# Patient Record
Sex: FEMALE
[De-identification: names, ages, dates, MRNs, and addresses within clinical notes are randomized per-mention and may not be internally consistent; named-entity substitution may affect disease eponyms.]

---

## 2020-01-23 ENCOUNTER — HOSPITAL ENCOUNTER (OUTPATIENT)
Dept: HOSPITAL 92 - BICMAMMO | Age: 35
Discharge: HOME | End: 2020-01-23
Attending: OBSTETRICS & GYNECOLOGY
Payer: COMMERCIAL

## 2020-01-23 DIAGNOSIS — Z80.3: ICD-10-CM

## 2020-01-23 DIAGNOSIS — Z12.31: Primary | ICD-10-CM

## 2020-01-23 PROCEDURE — 77067 SCR MAMMO BI INCL CAD: CPT

## 2020-01-23 PROCEDURE — 77063 BREAST TOMOSYNTHESIS BI: CPT

## 2020-01-23 NOTE — MMO
Bilateral MAMMO Bilat Screen [facility].

 

CLINICAL HISTORY:

Patient is 34 years old and is seen for screening. The patient has the following

family history of breast cancer:  paternal aunt, malignant (generic); maternal

grandmother, malignant (generic) and cousin female, malignant (generic),

MATERNAL.  The patient has no personal history of cancer.

 

VIEWS:

The views performed were:  bilateral craniocaudal with tomosynthesis and

bilateral mediolateral oblique with tomosynthesis.

 

This study has been interpreted with the assistance of computer-aided detection.

 

MAMMOGRAM FINDINGS:

The breasts are heterogeneously dense, which could obscure a lesion on

mammography.

 

There are benign appearing calcifications seen in both breasts.

 

There are no suspicious masses, suspicious calcifications, or new areas of

architectural distortion.

 

IMPRESSION:

THERE IS NO MAMMOGRAPHIC EVIDENCE OF MALIGNANCY.

 

A ROUTINE FOLLOW-UP MAMMOGRAM AT AGE 39 IS RECOMMENDED.

 

THE RESULTS OF THIS EXAM WERE SENT TO THE PATIENT.

 

ACR BI-RADS Category 2 - Benign finding

 

MAMMOGRAPHY NOTE:

 1. A negative mammogram report should not delay a biopsy if a dominant of

 clinically suspicious mass is present.

 2. Approximately 10% to 15% of breast cancers are not detected by

 mammography.

 3. Adenosis and dense breasts may obscure an underlying neoplasm.

 

 

Reported by: MI PALOMO MD

Electonically Signed: [id number]

## 2024-09-25 ENCOUNTER — HOSPITAL ENCOUNTER (OUTPATIENT)
Dept: HOSPITAL 92 - CSHMRI | Age: 39
Discharge: HOME | End: 2024-09-25
Attending: NEUROLOGICAL SURGERY
Payer: COMMERCIAL

## 2024-09-25 DIAGNOSIS — M51.17: Primary | ICD-10-CM

## 2024-09-25 PROCEDURE — 72148 MRI LUMBAR SPINE W/O DYE: CPT
